# Patient Record
Sex: MALE | Race: WHITE | NOT HISPANIC OR LATINO | URBAN - NONMETROPOLITAN AREA
[De-identification: names, ages, dates, MRNs, and addresses within clinical notes are randomized per-mention and may not be internally consistent; named-entity substitution may affect disease eponyms.]

---

## 2020-03-11 ENCOUNTER — IMPORTED ENCOUNTER (OUTPATIENT)
Dept: URBAN - NONMETROPOLITAN AREA CLINIC 1 | Facility: CLINIC | Age: 70
End: 2020-03-11

## 2020-03-11 PROBLEM — H52.4: Noted: 2020-03-11

## 2020-03-11 PROBLEM — H52.03: Noted: 2020-03-11

## 2020-03-11 PROBLEM — H25.13: Noted: 2020-03-11

## 2020-03-11 PROBLEM — H52.222: Noted: 2020-03-11

## 2020-03-11 PROBLEM — H52.223: Noted: 2020-03-11

## 2020-03-11 PROCEDURE — 92015 DETERMINE REFRACTIVE STATE: CPT

## 2020-03-11 PROCEDURE — 92014 COMPRE OPH EXAM EST PT 1/>: CPT

## 2020-03-11 NOTE — PATIENT DISCUSSION
Cataract OU-Not yet surgical. -Reviewed symptoms of advancing cataract growth such as glare and halos and decreased vision.-Continue to monitor for now. Pt will notify us if any new symptoms develop. Hyperopia/Presbyopia /Astigmatism OS -Discussed diagnosis with patient. -MR done today and new GLS rx given -Continue to monitor **Patient has postive family of ARMD in his mother. No ARMD noted on todays exam or previous but recommend obtaining test every so often to rule out ARMD**; 's Notes: MR and DFE 3/11/20 **Patient has postive family of ARMD in his mother.  No ARMD noted on todays exam or previous but recommend obtaining test every so often to rule out ARMD**

## 2022-04-10 ASSESSMENT — VISUAL ACUITY
OD_SC: 20/30-
OS_SC: 20/30-

## 2022-04-10 ASSESSMENT — TONOMETRY
OD_IOP_MMHG: 16
OS_IOP_MMHG: 16

## 2024-05-16 ENCOUNTER — NEW PATIENT (OUTPATIENT)
Dept: RURAL CLINIC 3 | Facility: CLINIC | Age: 74
End: 2024-05-16

## 2024-05-16 DIAGNOSIS — H52.223: ICD-10-CM

## 2024-05-16 DIAGNOSIS — H52.03: ICD-10-CM

## 2024-05-16 DIAGNOSIS — H52.4: ICD-10-CM

## 2024-05-16 PROCEDURE — 92015 DETERMINE REFRACTIVE STATE: CPT

## 2024-05-16 PROCEDURE — 92004 COMPRE OPH EXAM NEW PT 1/>: CPT

## 2024-05-16 ASSESSMENT — VISUAL ACUITY
OS_CC: 20/40
OS_PH: 20/20
OD_PH: 20/30
OD_CC: 20/40-2

## 2024-05-16 ASSESSMENT — TONOMETRY
OD_IOP_MMHG: 16
OS_IOP_MMHG: 16

## 2024-11-14 ENCOUNTER — CONSULTATION/EVALUATION (OUTPATIENT)
Dept: RURAL CLINIC 3 | Facility: CLINIC | Age: 74
End: 2024-11-14

## 2024-11-14 DIAGNOSIS — H25.13: ICD-10-CM

## 2024-11-14 PROCEDURE — 92025 CPTRIZED CORNEAL TOPOGRAPHY: CPT | Mod: NC

## 2024-11-14 PROCEDURE — 92134 CPTRZ OPH DX IMG PST SGM RTA: CPT | Mod: NC

## 2024-11-14 PROCEDURE — 92136 OPHTHALMIC BIOMETRY: CPT

## 2024-11-14 PROCEDURE — 99214 OFFICE O/P EST MOD 30 MIN: CPT

## 2024-12-18 ENCOUNTER — PRE-OP/H&P (OUTPATIENT)
Age: 74
End: 2024-12-18

## 2024-12-18 VITALS
WEIGHT: 116 LBS | DIASTOLIC BLOOD PRESSURE: 74 MMHG | HEIGHT: 69 IN | SYSTOLIC BLOOD PRESSURE: 124 MMHG | HEART RATE: 81 BPM | BODY MASS INDEX: 17.18 KG/M2

## 2024-12-18 DIAGNOSIS — Z01.818: ICD-10-CM

## 2024-12-18 PROCEDURE — 99213 OFFICE O/P EST LOW 20 MIN: CPT
